# Patient Record
Sex: FEMALE | Race: WHITE | NOT HISPANIC OR LATINO | URBAN - NONMETROPOLITAN AREA
[De-identification: names, ages, dates, MRNs, and addresses within clinical notes are randomized per-mention and may not be internally consistent; named-entity substitution may affect disease eponyms.]

---

## 2020-06-17 ENCOUNTER — OFFICE VISIT (OUTPATIENT)
Dept: URBAN - NONMETROPOLITAN AREA SURGERY CENTER 1 | Facility: SURGERY CENTER | Age: 75
End: 2020-06-17
Payer: MEDICARE

## 2020-06-17 ENCOUNTER — CLAIMS CREATED FROM THE CLAIM WINDOW (OUTPATIENT)
Dept: URBAN - METROPOLITAN AREA CLINIC 4 | Facility: CLINIC | Age: 75
End: 2020-06-17
Payer: MEDICARE

## 2020-06-17 DIAGNOSIS — R12 HEARTBURN: ICD-10-CM

## 2020-06-17 DIAGNOSIS — R13.12 DYSPHAGIA, OROPHARYNGEAL PHASE: ICD-10-CM

## 2020-06-17 DIAGNOSIS — K31.89 ACQUIRED DEFORMITY OF PYLORUS: ICD-10-CM

## 2020-06-17 DIAGNOSIS — K29.70 GASTRITIS, UNSPECIFIED, WITHOUT BLEEDING: ICD-10-CM

## 2020-06-17 DIAGNOSIS — K21.9 GASTRO-ESOPHAGEAL REFLUX DISEASE WITHOUT ESOPHAGITIS: ICD-10-CM

## 2020-06-17 DIAGNOSIS — K29.00 ACUTE GASTRITIS: ICD-10-CM

## 2020-06-17 PROCEDURE — 88305 TISSUE EXAM BY PATHOLOGIST: CPT | Performed by: PATHOLOGY

## 2020-06-17 PROCEDURE — 43239 EGD BIOPSY SINGLE/MULTIPLE: CPT | Performed by: INTERNAL MEDICINE

## 2020-06-17 PROCEDURE — 88312 SPECIAL STAINS GROUP 1: CPT | Performed by: PATHOLOGY

## 2020-06-17 PROCEDURE — G8907 PT DOC NO EVENTS ON DISCHARG: HCPCS | Performed by: INTERNAL MEDICINE

## 2020-06-26 ENCOUNTER — OFFICE VISIT (OUTPATIENT)
Dept: URBAN - NONMETROPOLITAN AREA CLINIC 2 | Facility: CLINIC | Age: 75
End: 2020-06-26
Payer: MEDICARE

## 2020-06-26 ENCOUNTER — TELEPHONE ENCOUNTER (OUTPATIENT)
Dept: URBAN - METROPOLITAN AREA CLINIC 92 | Facility: CLINIC | Age: 75
End: 2020-06-26

## 2020-06-26 DIAGNOSIS — K22.4 ESOPHAGEAL DYSMOTILITY: ICD-10-CM

## 2020-06-26 DIAGNOSIS — K59.09 OTHER CONSTIPATION: ICD-10-CM

## 2020-06-26 DIAGNOSIS — Z12.11 COLON CANCER SCREENING: ICD-10-CM

## 2020-06-26 PROCEDURE — 1036F TOBACCO NON-USER: CPT | Performed by: INTERNAL MEDICINE

## 2020-06-26 PROCEDURE — G8418 CALC BMI BLW LOW PARAM F/U: HCPCS | Performed by: INTERNAL MEDICINE

## 2020-06-26 PROCEDURE — 99214 OFFICE O/P EST MOD 30 MIN: CPT | Performed by: INTERNAL MEDICINE

## 2020-06-26 PROCEDURE — G9903 PT SCRN TBCO ID AS NON USER: HCPCS | Performed by: INTERNAL MEDICINE

## 2020-06-26 PROCEDURE — G8427 DOCREV CUR MEDS BY ELIG CLIN: HCPCS | Performed by: INTERNAL MEDICINE

## 2020-06-26 PROCEDURE — 3017F COLORECTAL CA SCREEN DOC REV: CPT | Performed by: INTERNAL MEDICINE

## 2020-06-26 RX ORDER — ASCORBIC ACID 125 MG
TABLET,CHEWABLE ORAL
Qty: 0 | Refills: 0 | Status: ACTIVE | COMMUNITY
Start: 1900-01-01

## 2020-06-26 RX ORDER — AMITRIPTYLINE HYDROCHLORIDE 25 MG/1
TAKE 1 TABLET BY MOUTH AT BEDTIME FOR 90 DAYS TABLET, FILM COATED ORAL
Qty: 90 | Refills: 3 | Status: ACTIVE | COMMUNITY
Start: 2020-05-24 | End: 2021-05-19

## 2020-06-26 RX ORDER — MONTELUKAST SODIUM 10 MG/1
TAKE 1 TABLET (10 MG) BY ORAL ROUTE ONCE DAILY IN THE EVENING TABLET, FILM COATED ORAL 1
Qty: 0 | Refills: 0 | Status: ACTIVE | COMMUNITY
Start: 1900-01-01

## 2020-06-26 RX ORDER — HYOSCYAMINE SULFATE 0.12 MG/1
TAKE ONE TABLET EVERY 4 HRS PRN CHEST PAIN TABLET ORAL
Qty: 60 | Refills: 3 | Status: ACTIVE | COMMUNITY
Start: 2020-05-24

## 2020-06-26 RX ORDER — FAMOTIDINE 20 MG/1
TAKE 1 TABLET BEFORE LUNCH AND 2 TABLETS AT BEDTIME TABLET ORAL 1
Qty: 270 | Refills: 3 | Status: ACTIVE | COMMUNITY
Start: 2020-05-24 | End: 2021-05-19

## 2020-06-26 RX ORDER — HYOSCYAMINE SULFATE 0.12 MG/1
1 TABLET UNDER THE TONGUE AND ALLOW TO DISSOLVE  AS NEEDED TABLET, ORALLY DISINTEGRATING ORAL PRN
Qty: 30 | Refills: 3 | OUTPATIENT
Start: 2020-06-26 | End: 2020-10-24

## 2020-06-26 RX ORDER — OMEGA-3 FATTY ACIDS/FISH OIL 360-1200MG
CAPSULE ORAL
Qty: 0 | Refills: 0 | Status: ACTIVE | COMMUNITY
Start: 1900-01-01

## 2020-06-26 RX ORDER — PHENYLEPHRINE HCL 10 MG
TABLET ORAL
Qty: 0 | Refills: 0 | Status: ACTIVE | COMMUNITY
Start: 1900-01-01

## 2020-06-26 RX ORDER — ALBUTEROL SULFATE 90 UG/1
AEROSOL, METERED RESPIRATORY (INHALATION)
Qty: 0 | Refills: 0 | Status: ACTIVE | COMMUNITY
Start: 1900-01-01

## 2020-06-26 RX ORDER — CONJUGATED ESTROGENS 0.62 MG/G
CREAM VAGINAL
Qty: 0 | Refills: 0 | Status: ACTIVE | COMMUNITY
Start: 1900-01-01

## 2020-06-26 RX ORDER — MELOXICAM 15 MG/1
TAKE 1 TABLET (15 MG) BY ORAL ROUTE ONCE DAILY TABLET ORAL 1
Qty: 0 | Refills: 0 | Status: ACTIVE | COMMUNITY
Start: 1900-01-01

## 2020-06-26 RX ORDER — PANTOPRAZOLE SODIUM 40 MG/1
TAKE 1 TABLET BY ORAL ROUTE DAILY FOR 90 DAYS 30 MINUTES BEFORE BREAKFAST TABLET, DELAYED RELEASE ORAL 1
Qty: 90 | Refills: 3 | Status: ACTIVE | COMMUNITY
Start: 2020-05-24 | End: 2021-05-19

## 2020-06-26 RX ORDER — TURMERIC 400 MG
CAPSULE ORAL
Qty: 0 | Refills: 0 | Status: ACTIVE | COMMUNITY
Start: 1900-01-01

## 2020-06-26 NOTE — HPI-TODAY'S VISIT:
Patient continues to have episodic subxiphoid pain about twice per week.  She states that at times this can be extremely severe and radiates through to her back.  Last episode lasted about 30 minutes and seemed to be relieved with the Levsin.  She has no diaphoresis or shortness of breath.  She knows of no aggravating factors.  She has slight nausea but no vomiting. Patient denies heartburn or indigestion.  She does have frequent "choking".  This is particularly bad with bread.  She has no problem with meat.  She does not have to regurgitate the ingested bolus. She was given nitroglycerin for the pain when she was in the hospital.  She developed hypotension and blurred vision.

## 2020-07-20 ENCOUNTER — ERX REFILL RESPONSE (OUTPATIENT)
Dept: URBAN - NONMETROPOLITAN AREA CLINIC 2 | Facility: CLINIC | Age: 75
End: 2020-07-20

## 2020-07-20 RX ORDER — HYOSCYAMINE SULFATE 0.12 MG/1
DISSOLVE 1 TABLET UNDER THE TONGUE AS NEEDED TABLET ORAL; SUBLINGUAL
Qty: 90 TABLET | Refills: 2 | OUTPATIENT

## 2020-07-20 RX ORDER — HYOSCYAMINE SULFATE 0.12 MG/1
1 TABLET UNDER THE TONGUE AND ALLOW TO DISSOLVE  AS NEEDED TABLET, ORALLY DISINTEGRATING ORAL PRN
Qty: 30 | Refills: 3 | OUTPATIENT

## 2020-09-25 ENCOUNTER — OFFICE VISIT (OUTPATIENT)
Dept: URBAN - NONMETROPOLITAN AREA CLINIC 2 | Facility: CLINIC | Age: 75
End: 2020-09-25
Payer: MEDICARE

## 2020-09-25 DIAGNOSIS — Z12.11 COLON CANCER SCREENING: ICD-10-CM

## 2020-09-25 DIAGNOSIS — K22.4 ESOPHAGEAL DYSMOTILITY: ICD-10-CM

## 2020-09-25 DIAGNOSIS — K59.09 OTHER CONSTIPATION: ICD-10-CM

## 2020-09-25 DIAGNOSIS — K21.9 GASTROESOPHAGEAL REFLUX DISEASE WITHOUT ESOPHAGITIS: ICD-10-CM

## 2020-09-25 PROCEDURE — G8427 DOCREV CUR MEDS BY ELIG CLIN: HCPCS | Performed by: INTERNAL MEDICINE

## 2020-09-25 PROCEDURE — G8417 CALC BMI ABV UP PARAM F/U: HCPCS | Performed by: INTERNAL MEDICINE

## 2020-09-25 PROCEDURE — G9903 PT SCRN TBCO ID AS NON USER: HCPCS | Performed by: INTERNAL MEDICINE

## 2020-09-25 PROCEDURE — 3017F COLORECTAL CA SCREEN DOC REV: CPT | Performed by: INTERNAL MEDICINE

## 2020-09-25 PROCEDURE — 99214 OFFICE O/P EST MOD 30 MIN: CPT | Performed by: INTERNAL MEDICINE

## 2020-09-25 RX ORDER — HYOSCYAMINE SULFATE 0.12 MG/1
DISSOLVE 1 TABLET UNDER THE TONGUE AS NEEDED TABLET ORAL; SUBLINGUAL
Qty: 90 TABLET | Refills: 2 | Status: DISCONTINUED | COMMUNITY

## 2020-09-25 RX ORDER — MONTELUKAST SODIUM 10 MG/1
TAKE 1 TABLET (10 MG) BY ORAL ROUTE ONCE DAILY IN THE EVENING TABLET, FILM COATED ORAL 1
Qty: 0 | Refills: 0 | Status: ACTIVE | COMMUNITY
Start: 1900-01-01

## 2020-09-25 RX ORDER — OMEGA-3 FATTY ACIDS/FISH OIL 360-1200MG
CAPSULE ORAL
Qty: 0 | Refills: 0 | Status: DISCONTINUED | COMMUNITY
Start: 1900-01-01

## 2020-09-25 RX ORDER — CONJUGATED ESTROGENS 0.62 MG/G
CREAM VAGINAL
Qty: 0 | Refills: 0 | Status: DISCONTINUED | COMMUNITY
Start: 1900-01-01

## 2020-09-25 RX ORDER — PANTOPRAZOLE SODIUM 40 MG/1
TAKE 1 TABLET BY ORAL ROUTE DAILY FOR 90 DAYS 30 MINUTES BEFORE BREAKFAST TABLET, DELAYED RELEASE ORAL 1
Qty: 90 | Refills: 3 | Status: ACTIVE | COMMUNITY
Start: 2020-05-24 | End: 2021-05-19

## 2020-09-25 RX ORDER — ALBUTEROL SULFATE 90 UG/1
AEROSOL, METERED RESPIRATORY (INHALATION)
Qty: 0 | Refills: 0 | Status: ACTIVE | COMMUNITY
Start: 1900-01-01

## 2020-09-25 RX ORDER — MELOXICAM 15 MG/1
TAKE 1 TABLET (15 MG) BY ORAL ROUTE ONCE DAILY TABLET ORAL 1
Qty: 0 | Refills: 0 | Status: ACTIVE | COMMUNITY
Start: 1900-01-01

## 2020-09-25 RX ORDER — AMITRIPTYLINE HYDROCHLORIDE 25 MG/1
TAKE 1 TABLET BY MOUTH AT BEDTIME FOR 90 DAYS TABLET, FILM COATED ORAL
Qty: 90 | Refills: 3 | Status: ACTIVE | COMMUNITY
Start: 2020-05-24 | End: 2021-05-19

## 2020-09-25 RX ORDER — HYOSCYAMINE SULFATE 0.12 MG/1
TAKE ONE TABLET EVERY 4 HRS PRN CHEST PAIN TABLET ORAL
Qty: 60 | Refills: 3 | Status: ACTIVE | COMMUNITY
Start: 2020-05-24

## 2020-09-25 RX ORDER — PHENYLEPHRINE HCL 10 MG
TABLET ORAL
Qty: 0 | Refills: 0 | Status: DISCONTINUED | COMMUNITY
Start: 1900-01-01

## 2020-09-25 RX ORDER — ASCORBIC ACID 125 MG
TABLET,CHEWABLE ORAL
Qty: 0 | Refills: 0 | Status: ACTIVE | COMMUNITY
Start: 1900-01-01

## 2020-09-25 RX ORDER — FAMOTIDINE 20 MG/1
TAKE 1 TABLET BEFORE LUNCH AND 2 TABLETS AT BEDTIME TABLET ORAL 1
Qty: 270 | Refills: 3 | Status: ACTIVE | COMMUNITY
Start: 2020-05-24 | End: 2021-05-19

## 2020-09-25 RX ORDER — TURMERIC 400 MG
CAPSULE ORAL
Qty: 0 | Refills: 0 | Status: DISCONTINUED | COMMUNITY
Start: 1900-01-01

## 2020-09-25 NOTE — HPI-TODAY'S VISIT:
Patient returns for follow-up of reflux and esophageal spasm.  She states that her reflux is well controlled with pantoprazole.  She has rare dysphagia with large pills and bread.  She has had 2 episodes of chest pain for which she has taken the Levsin.  She has no nocturnal symptoms.  She denies nausea or vomiting.  She has no belching or regurgitation.  There is no abdominal pain.  She has no extra esophageal symptoms. Her bowel movements are regular with a fiber supplement.  She has no evidence of bleeding.

## 2021-07-13 ENCOUNTER — ERX REFILL RESPONSE (OUTPATIENT)
Dept: URBAN - NONMETROPOLITAN AREA CLINIC 2 | Facility: CLINIC | Age: 76
End: 2021-07-13

## 2021-07-13 RX ORDER — AMITRIPTYLINE HYDROCHLORIDE 25 MG/1
TAKE 1 TABLET BY MOUTH EVERYDAY AT BEDTIME TABLET, FILM COATED ORAL
Qty: 90 TABLET | Refills: 4 | OUTPATIENT

## 2021-09-22 ENCOUNTER — ERX REFILL RESPONSE (OUTPATIENT)
Dept: URBAN - NONMETROPOLITAN AREA CLINIC 2 | Facility: CLINIC | Age: 76
End: 2021-09-22

## 2021-09-22 RX ORDER — PANTOPRAZOLE SODIUM 40 MG/1
TAKE 1 TABLET BY MOUTH DAILY 30 MINUTES BEFORE BREAKFAST TABLET, DELAYED RELEASE ORAL
Qty: 90 TABLET | Refills: 4 | OUTPATIENT

## 2022-01-25 ENCOUNTER — OFFICE VISIT (OUTPATIENT)
Dept: URBAN - NONMETROPOLITAN AREA CLINIC 2 | Facility: CLINIC | Age: 77
End: 2022-01-25
Payer: MEDICARE

## 2022-01-25 ENCOUNTER — WEB ENCOUNTER (OUTPATIENT)
Dept: URBAN - NONMETROPOLITAN AREA CLINIC 2 | Facility: CLINIC | Age: 77
End: 2022-01-25

## 2022-01-25 DIAGNOSIS — K22.4 ESOPHAGEAL DYSMOTILITY: ICD-10-CM

## 2022-01-25 DIAGNOSIS — Z12.11 COLON CANCER SCREENING: ICD-10-CM

## 2022-01-25 DIAGNOSIS — K59.09 OTHER CONSTIPATION: ICD-10-CM

## 2022-01-25 DIAGNOSIS — K21.9 GASTROESOPHAGEAL REFLUX DISEASE WITHOUT ESOPHAGITIS: ICD-10-CM

## 2022-01-25 PROCEDURE — 99213 OFFICE O/P EST LOW 20 MIN: CPT | Performed by: NURSE PRACTITIONER

## 2022-01-25 RX ORDER — AMITRIPTYLINE HYDROCHLORIDE 25 MG/1
TAKE 1 TABLET BY MOUTH EVERYDAY AT BEDTIME TABLET, FILM COATED ORAL ONCE A DAY
Qty: 90 TABLETS | Refills: 3

## 2022-01-25 RX ORDER — AMITRIPTYLINE HYDROCHLORIDE 25 MG/1
TAKE 1 TABLET BY MOUTH EVERYDAY AT BEDTIME TABLET, FILM COATED ORAL
Qty: 90 TABLET | Refills: 4 | Status: ACTIVE | COMMUNITY

## 2022-01-25 RX ORDER — HYOSCYAMINE SULFATE 0.12 MG/1
TAKE ONE TABLET EVERY 4 HRS PRN CHEST PAIN TABLET ORAL
Qty: 60 | Refills: 3 | Status: ACTIVE | COMMUNITY
Start: 2020-05-24

## 2022-01-25 RX ORDER — MONTELUKAST SODIUM 10 MG/1
TAKE 1 TABLET (10 MG) BY ORAL ROUTE ONCE DAILY IN THE EVENING TABLET, FILM COATED ORAL 1
Qty: 0 | Refills: 0 | Status: ACTIVE | COMMUNITY
Start: 1900-01-01

## 2022-01-25 RX ORDER — FAMOTIDINE 20 MG/1
1 TABLET IN MORNING AND 2 AT BEDTIME TABLET, FILM COATED ORAL TWICE A DAY
Status: ACTIVE | COMMUNITY

## 2022-01-25 RX ORDER — MELOXICAM 15 MG/1
TAKE 1 TABLET (15 MG) BY ORAL ROUTE ONCE DAILY TABLET ORAL 1
Qty: 0 | Refills: 0 | Status: ACTIVE | COMMUNITY
Start: 1900-01-01

## 2022-01-25 RX ORDER — FAMOTIDINE 20 MG/1
1 TABLET IN MORNING AND 2 AT BEDTIME TABLET, FILM COATED ORAL TWICE A DAY
Qty: 270 TABLETS | Refills: 3

## 2022-01-25 RX ORDER — ALBUTEROL SULFATE 90 UG/1
AEROSOL, METERED RESPIRATORY (INHALATION)
Qty: 0 | Refills: 0 | Status: ACTIVE | COMMUNITY
Start: 1900-01-01

## 2022-01-25 RX ORDER — PANTOPRAZOLE SODIUM 40 MG/1
1 TABLET 30 MINUTES BEFORE BREAKFAST TABLET, DELAYED RELEASE ORAL ONCE A DAY
Qty: 90 TABLETS | Refills: 3

## 2022-01-25 RX ORDER — PANTOPRAZOLE SODIUM 40 MG/1
TAKE 1 TABLET BY MOUTH DAILY 30 MINUTES BEFORE BREAKFAST TABLET, DELAYED RELEASE ORAL
Qty: 90 TABLET | Refills: 4 | Status: ACTIVE | COMMUNITY

## 2022-01-25 RX ORDER — ASCORBIC ACID 125 MG
TABLET,CHEWABLE ORAL
Qty: 0 | Refills: 0 | Status: ACTIVE | COMMUNITY
Start: 1900-01-01

## 2022-01-25 NOTE — HPI-TODAY'S VISIT:
9/25/2020 Patient returns for follow-up of reflux and esophageal spasm.  She states that her reflux is well controlled with pantoprazole.  She has rare dysphagia with large pills and bread.  She has had 2 episodes of chest pain for which she has taken the Levsin.  She has no nocturnal symptoms.  She denies nausea or vomiting.  She has no belching or regurgitation.  There is no abdominal pain.  She has no extra esophageal symptoms. Her bowel movements are regular with a fiber supplement.  She has no evidence of bleeding.  1/25/21 Patient presents for follow up for reflux and esophageam spasm. Her reflux is well controlled on pantoprazole 40mg once daily and famotidine 20mg one in AM and two at bedtime. She has Levsin for spasm/chest pain symptoms that she rarely needs but does work. She thinks she has taken it maybe 4 times in the last year. She did run out of her reflux medications about 2 weeks ago and has more belching than normal. Denies abdominal pain. No nausea/vomiting.  Her bowel movements are regular with a fiber supplement and she has no bleeding. She last had colonoscopy 2/2013 and is due for repeat 2/2023.  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 Mora in 12/20/21. She is on meloxicam since surgery. Overall, she is doing well post operatively. TG

## 2023-02-02 ENCOUNTER — TELEPHONE ENCOUNTER (OUTPATIENT)
Dept: URBAN - NONMETROPOLITAN AREA CLINIC 13 | Facility: CLINIC | Age: 78
End: 2023-02-02

## 2023-02-02 RX ORDER — AMITRIPTYLINE HYDROCHLORIDE 25 MG/1
TAKE 1 TABLET BY MOUTH EVERYDAY AT BEDTIME TABLET, FILM COATED ORAL ONCE A DAY
Qty: 30 | Refills: 6

## 2023-02-16 ENCOUNTER — TELEPHONE ENCOUNTER (OUTPATIENT)
Dept: URBAN - NONMETROPOLITAN AREA CLINIC 2 | Facility: CLINIC | Age: 78
End: 2023-02-16

## 2023-02-16 RX ORDER — PANTOPRAZOLE SODIUM 40 MG/1
1 TABLET 30 MINUTES BEFORE BREAKFAST TABLET, DELAYED RELEASE ORAL ONCE A DAY
Qty: 90 TABLETS | Refills: 3

## 2023-02-16 RX ORDER — FAMOTIDINE 20 MG/1
1 TABLET IN MORNING AND 2 AT BEDTIME TABLET, FILM COATED ORAL TWICE A DAY
Qty: 270 TABLETS | Refills: 3

## 2023-05-11 ENCOUNTER — OFFICE VISIT (OUTPATIENT)
Dept: URBAN - NONMETROPOLITAN AREA CLINIC 13 | Facility: CLINIC | Age: 78
End: 2023-05-11
Payer: MEDICARE

## 2023-05-11 ENCOUNTER — DASHBOARD ENCOUNTERS (OUTPATIENT)
Age: 78
End: 2023-05-11

## 2023-05-11 VITALS
WEIGHT: 144.2 LBS | SYSTOLIC BLOOD PRESSURE: 129 MMHG | HEART RATE: 111 BPM | BODY MASS INDEX: 27.23 KG/M2 | HEIGHT: 61 IN | DIASTOLIC BLOOD PRESSURE: 81 MMHG | OXYGEN SATURATION: 98 %

## 2023-05-11 DIAGNOSIS — Z12.11 COLON CANCER SCREENING: ICD-10-CM

## 2023-05-11 DIAGNOSIS — K21.9 GASTROESOPHAGEAL REFLUX DISEASE WITHOUT ESOPHAGITIS: ICD-10-CM

## 2023-05-11 PROCEDURE — 99213 OFFICE O/P EST LOW 20 MIN: CPT | Performed by: INTERNAL MEDICINE

## 2023-05-11 RX ORDER — FAMOTIDINE 20 MG/1
1 TABLET IN MORNING AND 2 AT BEDTIME TABLET, FILM COATED ORAL TWICE A DAY
Qty: 270 TABLETS | Refills: 3 | Status: ACTIVE | COMMUNITY

## 2023-05-11 RX ORDER — MONTELUKAST SODIUM 10 MG/1
TAKE 1 TABLET (10 MG) BY ORAL ROUTE ONCE DAILY IN THE EVENING TABLET, FILM COATED ORAL 1
Qty: 0 | Refills: 0 | Status: ACTIVE | COMMUNITY
Start: 1900-01-01

## 2023-05-11 RX ORDER — ALBUTEROL SULFATE 90 UG/1
AEROSOL, METERED RESPIRATORY (INHALATION)
Qty: 0 | Refills: 0 | Status: ACTIVE | COMMUNITY
Start: 1900-01-01

## 2023-05-11 RX ORDER — MELOXICAM 15 MG/1
TAKE 1 TABLET (15 MG) BY ORAL ROUTE ONCE DAILY TABLET ORAL 1
Qty: 0 | Refills: 0 | Status: ACTIVE | COMMUNITY
Start: 1900-01-01

## 2023-05-11 RX ORDER — FAMOTIDINE 20 MG/1
1 TABLET IN MORNING AND 2 AT BEDTIME TABLET, FILM COATED ORAL TWICE A DAY
Qty: 270 TABLETS | Refills: 3

## 2023-05-11 RX ORDER — PANTOPRAZOLE SODIUM 40 MG/1
1 TABLET 30 MINUTES BEFORE BREAKFAST TABLET, DELAYED RELEASE ORAL ONCE A DAY
Qty: 90 TABLETS | Refills: 3

## 2023-05-11 RX ORDER — ASCORBIC ACID 125 MG
TABLET,CHEWABLE ORAL
Qty: 0 | Refills: 0 | Status: ACTIVE | COMMUNITY
Start: 1900-01-01

## 2023-05-11 RX ORDER — PANTOPRAZOLE SODIUM 40 MG/1
1 TABLET 30 MINUTES BEFORE BREAKFAST TABLET, DELAYED RELEASE ORAL ONCE A DAY
Qty: 90 TABLETS | Refills: 3 | Status: ACTIVE | COMMUNITY

## 2023-05-11 RX ORDER — AMITRIPTYLINE HYDROCHLORIDE 25 MG/1
TAKE 1 TABLET BY MOUTH EVERYDAY AT BEDTIME TABLET, FILM COATED ORAL ONCE A DAY
Qty: 30 | Refills: 6 | Status: ACTIVE | COMMUNITY

## 2023-05-11 RX ORDER — HYOSCYAMINE SULFATE 0.12 MG/1
TAKE ONE TABLET EVERY 4 HRS PRN CHEST PAIN TABLET ORAL
Qty: 60 | Refills: 3 | Status: ACTIVE | COMMUNITY
Start: 2020-05-24

## 2023-05-11 NOTE — HPI-TODAY'S VISIT:
9/25/2020 Patient returns for follow-up of reflux and esophageal spasm.  She states that her reflux is well controlled with pantoprazole.  She has rare dysphagia with large pills and bread.  She has had 2 episodes of chest pain for which she has taken the Levsin.  She has no nocturnal symptoms.  She denies nausea or vomiting.  She has no belching or regurgitation.  There is no abdominal pain.  She has no extra esophageal symptoms. Her bowel movements are regular with a fiber supplement.  She has no evidence of bleeding.  1/25/21 Patient presents for follow up for reflux and esophageam spasm. Her reflux is well controlled on pantoprazole 40mg once daily and famotidine 20mg one in AM and two at bedtime. She has Levsin for spasm/chest pain symptoms that she rarely needs but does work. She thinks she has taken it maybe 4 times in the last year. She did run out of her reflux medications about 2 weeks ago and has more belching than normal. Denies abdominal pain. No nausea/vomiting.  Her bowel movements are regular with a fiber supplement and she has no bleeding. She last had colonoscopy 2/2013 and is due for repeat 2/2023.  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 Mora in 12/20/21. She is on meloxicam since surgery. Overall, she is doing well post operatively. CAMILLE Lewis is a very pleasant 77-year-old female who returns for follow-up.  She has previously seen by Dr. Moser.  She has a history of reflux and esophageal spasm.  This is well controlled with acid suppression.  She is due for colonoscopy.  She was due in February 2023.  She received a alert and this was the first appointment that she can get scheduled.  She has no acute GI complaints.  He denies any anticoagulation or cardiovascular problems.

## 2023-06-26 ENCOUNTER — TELEPHONE ENCOUNTER (OUTPATIENT)
Dept: URBAN - NONMETROPOLITAN AREA CLINIC 13 | Facility: CLINIC | Age: 78
End: 2023-06-26

## 2023-06-27 ENCOUNTER — LAB OUTSIDE AN ENCOUNTER (OUTPATIENT)
Dept: URBAN - NONMETROPOLITAN AREA CLINIC 13 | Facility: CLINIC | Age: 78
End: 2023-06-27

## 2023-06-27 PROBLEM — 266435005: Status: ACTIVE | Noted: 2020-09-27

## 2023-07-25 ENCOUNTER — OFFICE VISIT (OUTPATIENT)
Dept: URBAN - NONMETROPOLITAN AREA SURGERY CENTER 1 | Facility: SURGERY CENTER | Age: 78
End: 2023-07-25

## 2023-08-01 ENCOUNTER — OFFICE VISIT (OUTPATIENT)
Dept: URBAN - NONMETROPOLITAN AREA SURGERY CENTER 1 | Facility: SURGERY CENTER | Age: 78
End: 2023-08-01

## 2023-08-23 ENCOUNTER — OFFICE VISIT (OUTPATIENT)
Dept: URBAN - NONMETROPOLITAN AREA SURGERY CENTER 1 | Facility: SURGERY CENTER | Age: 78
End: 2023-08-23

## 2023-09-13 ENCOUNTER — WEB ENCOUNTER (OUTPATIENT)
Dept: URBAN - NONMETROPOLITAN AREA SURGERY CENTER 1 | Facility: SURGERY CENTER | Age: 78
End: 2023-09-13

## 2023-09-19 ENCOUNTER — OFFICE VISIT (OUTPATIENT)
Dept: URBAN - NONMETROPOLITAN AREA SURGERY CENTER 1 | Facility: SURGERY CENTER | Age: 78
End: 2023-09-19
Payer: MEDICARE

## 2023-09-19 ENCOUNTER — CLAIMS CREATED FROM THE CLAIM WINDOW (OUTPATIENT)
Dept: URBAN - METROPOLITAN AREA CLINIC 4 | Facility: CLINIC | Age: 78
End: 2023-09-19
Payer: MEDICARE

## 2023-09-19 DIAGNOSIS — K44.9 HIATAL HERNIA: ICD-10-CM

## 2023-09-19 DIAGNOSIS — K21.9 GASTRO-ESOPHAGEAL REFLUX DISEASE WITHOUT ESOPHAGITIS: ICD-10-CM

## 2023-09-19 DIAGNOSIS — K56.2 VOLVULUS OF COLON: ICD-10-CM

## 2023-09-19 DIAGNOSIS — K22.89 OTHER SPECIFIED DISEASE OF ESOPHAGUS: ICD-10-CM

## 2023-09-19 DIAGNOSIS — K21.9 ACID REFLUX: ICD-10-CM

## 2023-09-19 DIAGNOSIS — Z12.11 COLON CANCER SCREENING: ICD-10-CM

## 2023-09-19 DIAGNOSIS — K57.30 DIVERTCULOSIS OF LG INT W/O PERFORATION OR ABSCESS W/O BLEEDING: ICD-10-CM

## 2023-09-19 PROCEDURE — 88305 TISSUE EXAM BY PATHOLOGIST: CPT | Performed by: PATHOLOGY

## 2023-09-19 PROCEDURE — G8907 PT DOC NO EVENTS ON DISCHARG: HCPCS | Performed by: INTERNAL MEDICINE

## 2023-09-19 PROCEDURE — 00813 ANES UPR LWR GI NDSC PX: CPT | Performed by: NURSE ANESTHETIST, CERTIFIED REGISTERED

## 2023-09-19 PROCEDURE — 88312 SPECIAL STAINS GROUP 1: CPT | Performed by: PATHOLOGY

## 2023-09-19 PROCEDURE — 88313 SPECIAL STAINS GROUP 2: CPT | Performed by: PATHOLOGY

## 2023-09-19 PROCEDURE — G0121 COLON CA SCRN NOT HI RSK IND: HCPCS | Performed by: INTERNAL MEDICINE

## 2023-09-19 PROCEDURE — 43239 EGD BIOPSY SINGLE/MULTIPLE: CPT | Performed by: INTERNAL MEDICINE

## 2023-09-20 ENCOUNTER — TELEPHONE ENCOUNTER (OUTPATIENT)
Dept: URBAN - NONMETROPOLITAN AREA CLINIC 2 | Facility: CLINIC | Age: 78
End: 2023-09-20

## 2023-09-22 ENCOUNTER — TELEPHONE ENCOUNTER (OUTPATIENT)
Dept: URBAN - NONMETROPOLITAN AREA CLINIC 2 | Facility: CLINIC | Age: 78
End: 2023-09-22

## 2023-09-26 ENCOUNTER — OFFICE VISIT (OUTPATIENT)
Dept: URBAN - NONMETROPOLITAN AREA CLINIC 13 | Facility: CLINIC | Age: 78
End: 2023-09-26

## 2023-11-27 ENCOUNTER — TELEPHONE ENCOUNTER (OUTPATIENT)
Dept: URBAN - NONMETROPOLITAN AREA CLINIC 2 | Facility: CLINIC | Age: 78
End: 2023-11-27

## 2023-11-27 RX ORDER — AMITRIPTYLINE HYDROCHLORIDE 25 MG/1
TAKE 1 TABLET BY MOUTH EVERYDAY AT BEDTIME TABLET, FILM COATED ORAL ONCE A DAY
Qty: 30 | Refills: 6

## 2024-01-02 ENCOUNTER — P2P PATIENT RECORD (OUTPATIENT)
Age: 79
End: 2024-01-02

## 2024-01-27 ENCOUNTER — P2P PATIENT RECORD (OUTPATIENT)
Age: 79
End: 2024-01-27

## 2024-06-06 ENCOUNTER — ERX REFILL RESPONSE (OUTPATIENT)
Dept: URBAN - NONMETROPOLITAN AREA CLINIC 2 | Facility: CLINIC | Age: 79
End: 2024-06-06

## 2024-06-06 RX ORDER — AMITRIPTYLINE HYDROCHLORIDE 25 MG/1
TAKE 1 TABLET BY MOUTH EVERYDAY AT BEDTIME ORALLY ONCE A DAY 30 DAYS TABLET, FILM COATED ORAL
Qty: 30 TABLET | Refills: 6 | OUTPATIENT

## 2024-06-06 RX ORDER — AMITRIPTYLINE HYDROCHLORIDE 25 MG/1
TAKE 1 TABLET BY MOUTH EVERYDAY AT BEDTIME TABLET, FILM COATED ORAL ONCE A DAY
Qty: 30 | Refills: 6 | OUTPATIENT

## 2025-01-31 ENCOUNTER — ERX REFILL RESPONSE (OUTPATIENT)
Dept: URBAN - NONMETROPOLITAN AREA CLINIC 2 | Facility: CLINIC | Age: 80
End: 2025-01-31

## 2025-01-31 RX ORDER — AMITRIPTYLINE HYDROCHLORIDE 25 MG/1
TAKE 1 TABLET BY MOUTH EVERYDAY AT BEDTIME ORALLY ONCE A DAY 30 DAYS TABLET, FILM COATED ORAL
Qty: 30 TABLET | Refills: 6 | OUTPATIENT

## 2025-03-28 ENCOUNTER — TELEPHONE ENCOUNTER (OUTPATIENT)
Dept: URBAN - NONMETROPOLITAN AREA CLINIC 2 | Facility: CLINIC | Age: 80
End: 2025-03-28

## 2025-03-28 RX ORDER — PANTOPRAZOLE SODIUM 40 MG/1
TAKE 1 TABLET BY MOUTH EVERY DAY 30 MINUTES BEFORE BREAKFAST FOR 90 DAYS TABLET, DELAYED RELEASE ORAL
Qty: 90 TABLET | Refills: 3

## 2025-03-28 RX ORDER — FAMOTIDINE 20 MG/1
1 TABLET IN MORNING AND 2 AT BEDTIME TABLET, FILM COATED ORAL TWICE A DAY
Qty: 270 TABLETS | Refills: 3

## 2025-05-15 ENCOUNTER — OFFICE VISIT (OUTPATIENT)
Dept: URBAN - NONMETROPOLITAN AREA CLINIC 13 | Facility: CLINIC | Age: 80
End: 2025-05-15
Payer: MEDICARE

## 2025-05-15 DIAGNOSIS — Z12.11 COLON CANCER SCREENING: ICD-10-CM

## 2025-05-15 DIAGNOSIS — K21.9 GASTROESOPHAGEAL REFLUX DISEASE WITHOUT ESOPHAGITIS: ICD-10-CM

## 2025-05-15 DIAGNOSIS — K22.4 ESOPHAGEAL DYSMOTILITY: ICD-10-CM

## 2025-05-15 PROCEDURE — 99213 OFFICE O/P EST LOW 20 MIN: CPT | Performed by: NURSE PRACTITIONER

## 2025-05-15 RX ORDER — PANTOPRAZOLE SODIUM 40 MG/1
TAKE 1 TABLET BY MOUTH EVERY DAY 30 MINUTES BEFORE BREAKFAST FOR 90 DAYS TABLET, DELAYED RELEASE ORAL
Qty: 90 TABLET | Refills: 3 | Status: ACTIVE | COMMUNITY

## 2025-05-15 RX ORDER — PANTOPRAZOLE SODIUM 20 MG/1
1 TABLET TABLET, DELAYED RELEASE ORAL TWICE A DAY
Qty: 180 TABLET | Refills: 3
Start: 2025-05-15

## 2025-05-15 RX ORDER — MELOXICAM 15 MG/1
TAKE 1 TABLET (15 MG) BY ORAL ROUTE ONCE DAILY TABLET ORAL 1
Qty: 0 | Refills: 0 | Status: ON HOLD | COMMUNITY
Start: 1900-01-01

## 2025-05-15 RX ORDER — MONTELUKAST SODIUM 10 MG/1
TAKE 1 TABLET (10 MG) BY ORAL ROUTE ONCE DAILY IN THE EVENING TABLET, FILM COATED ORAL 1
Qty: 0 | Refills: 0 | Status: ACTIVE | COMMUNITY
Start: 1900-01-01

## 2025-05-15 RX ORDER — FAMOTIDINE 20 MG/1
1 TABLET IN MORNING AND 2 AT BEDTIME TABLET, FILM COATED ORAL TWICE A DAY
Qty: 270 TABLETS | Refills: 3 | Status: ACTIVE | COMMUNITY

## 2025-05-15 RX ORDER — AMITRIPTYLINE HYDROCHLORIDE 25 MG/1
TAKE 1 TABLET BY MOUTH EVERYDAY AT BEDTIME TABLET, FILM COATED ORAL ONCE A DAY
Qty: 30 | Refills: 6
Start: 2025-05-15

## 2025-05-15 RX ORDER — HYOSCYAMINE SULFATE 0.12 MG/1
1 TABLET UNDER THE TONGUE AND ALLOW TO DISSOLVE TABLET ORAL; SUBLINGUAL
Qty: 90 | Refills: 3

## 2025-05-15 RX ORDER — DILTIAZEM HYDROCHLORIDE 120 MG/1
1 CAPSULE CAPSULE, EXTENDED RELEASE ORAL TWICE A DAY
Status: ACTIVE | COMMUNITY

## 2025-05-15 RX ORDER — ASCORBIC ACID 125 MG
TABLET,CHEWABLE ORAL
Qty: 0 | Refills: 0 | Status: ACTIVE | COMMUNITY
Start: 1900-01-01

## 2025-05-15 RX ORDER — MYCOPHENOLATE MOFETIL 500 MG/1
1 TABLET TABLET ORAL TWICE A DAY
Status: ACTIVE | COMMUNITY

## 2025-05-15 RX ORDER — AMITRIPTYLINE HYDROCHLORIDE 25 MG/1
TAKE 1 TABLET BY MOUTH EVERYDAY AT BEDTIME ORALLY ONCE A DAY 30 DAYS TABLET, FILM COATED ORAL
Qty: 30 TABLET | Refills: 6 | Status: ACTIVE | COMMUNITY

## 2025-05-15 RX ORDER — FAMOTIDINE 20 MG/1
1 TABLET IN MORNING AND 2 AT BEDTIME TABLET, FILM COATED ORAL TWICE A DAY
Qty: 270 TABLETS | Refills: 3
Start: 2025-05-15

## 2025-05-15 RX ORDER — HYOSCYAMINE SULFATE 0.12 MG/1
TAKE ONE TABLET EVERY 4 HRS PRN CHEST PAIN TABLET ORAL
Qty: 60 | Refills: 3 | Status: ACTIVE | COMMUNITY
Start: 2020-05-24

## 2025-05-15 RX ORDER — ALBUTEROL SULFATE 90 UG/1
AEROSOL, METERED RESPIRATORY (INHALATION)
Qty: 0 | Refills: 0 | Status: ACTIVE | COMMUNITY
Start: 1900-01-01

## 2025-05-15 NOTE — HPI-TODAY'S VISIT:
5/15/2025 Ms Debbie Garcia is a 79 year old female here for f/u of reflux and esophageal spasm. Since her last OV, she was dx with pulmonary fibrosis and heart palpatations. She was last seen in 2023 for colonoscopy and EGD. She has a long hx of reflux and esophageal spasms. She has been well contorlled on AMT 25mg, levsin prn, protonix 40mg, and pepcid BID. She has been cutting back on sodas and doing really well. She feels she could try to reduce her protonix.CS

## 2025-05-15 NOTE — HPI-OTHER HISTORIES
9/25/2020 Patient returns for follow-up of reflux and esophageal spasm. She states that her reflux is well controlled with pantoprazole. She has rare dysphagia with large pills and bread. She has had 2 episodes of chest pain for which she has taken the Levsin. She has no nocturnal symptoms. She denies nausea or vomiting. She has no belching or regurgitation. There is no abdominal pain. She has no extra esophageal symptoms. Her bowel movements are regular with a fiber supplement. She has no evidence of bleeding.  1/25/21 Patient presents for follow up for reflux and esophageam spasm. Her reflux is well controlled on pantoprazole 40mg once daily and famotidine 20mg one in AM and two at bedtime. She has Levsin for spasm/chest pain symptoms that she rarely needs but does work. She thinks she has taken it maybe 4 times in the last year. She did run out of her reflux medications about 2 weeks ago and has more belching than normal. Denies abdominal pain. No nausea/vomiting. Her bowel movements are regular with a fiber supplement and she has no bleeding. She last had colonoscopy 2/2013 and is due for repeat 2/2023. 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 Mora in 12/20/21. She is on meloxicam since surgery. Overall, she is doing well post operatively. TG  2023 Debbie is a very pleasant 77-year-old female who returns for follow-up. She has previously seen by Dr. Moser. She has a history of reflux and esophageal spasm. This is well controlled with acid suppression. She is due for colonoscopy. She was due in February 2023. She received a alert and this was the first appointment that she can get scheduled. She has no acute GI complaints. He denies any anticoagulation or cardiovascular problems.  9/19/2023 EGD: small hiatal hernia, erythema GE jx. Path reflux changes Colonocopy: sigmoid, descending diverticulosis

## 2025-05-19 ENCOUNTER — ERX REFILL RESPONSE (OUTPATIENT)
Dept: URBAN - NONMETROPOLITAN AREA CLINIC 13 | Facility: CLINIC | Age: 80
End: 2025-05-19

## 2025-05-19 RX ORDER — HYOSCYAMINE SULFATE 0.12 MG/1
1 TABLET UNDER THE TONGUE AND ALLOW TO DISSOLVE TABLET ORAL; SUBLINGUAL
Qty: 90 | Refills: 3 | OUTPATIENT

## 2025-05-19 RX ORDER — HYOSCYAMINE SULFATE 0.12 MG/1
1 TABLET ON THE TONGUE AND ALLOW TO DISSOLVE AS NEEDED TABLET, ORALLY DISINTEGRATING ORAL
Qty: 90 | Refills: 3 | OUTPATIENT